# Patient Record
Sex: FEMALE | Race: WHITE | Employment: OTHER | URBAN - METROPOLITAN AREA
[De-identification: names, ages, dates, MRNs, and addresses within clinical notes are randomized per-mention and may not be internally consistent; named-entity substitution may affect disease eponyms.]

---

## 2021-05-24 ENCOUNTER — OFFICE VISIT (OUTPATIENT)
Dept: URGENT CARE | Age: 81
End: 2021-05-24
Payer: MEDICARE

## 2021-05-24 VITALS
DIASTOLIC BLOOD PRESSURE: 64 MMHG | TEMPERATURE: 97.8 F | OXYGEN SATURATION: 96 % | SYSTOLIC BLOOD PRESSURE: 123 MMHG | HEART RATE: 88 BPM | RESPIRATION RATE: 16 BRPM

## 2021-05-24 DIAGNOSIS — R30.9 URINARY PAIN: ICD-10-CM

## 2021-05-24 DIAGNOSIS — N30.01 ACUTE CYSTITIS WITH HEMATURIA: Primary | ICD-10-CM

## 2021-05-24 LAB
BILIRUB UR QL STRIP: NEGATIVE
GLUCOSE UR-MCNC: NEGATIVE MG/DL
KETONES P FAST UR STRIP-MCNC: NEGATIVE MG/DL
PH UR STRIP: 6 [PH] (ref 4.6–8)
PROT UR QL STRIP: NEGATIVE
SP GR UR STRIP: 1.03 (ref 1–1.03)
UA UROBILINOGEN AMB POC: NORMAL (ref 0.2–1)
URINALYSIS CLARITY POC: NORMAL
URINALYSIS COLOR POC: NORMAL
URINE BLOOD POC: NORMAL
URINE LEUKOCYTES POC: NEGATIVE
URINE NITRITES POC: NEGATIVE

## 2021-05-24 PROCEDURE — 99203 OFFICE O/P NEW LOW 30 MIN: CPT | Performed by: NURSE PRACTITIONER

## 2021-05-24 PROCEDURE — 81003 URINALYSIS AUTO W/O SCOPE: CPT | Performed by: NURSE PRACTITIONER

## 2021-05-24 RX ORDER — CETIRIZINE HYDROCHLORIDE 10 MG/1
CAPSULE, LIQUID FILLED ORAL
COMMUNITY

## 2021-05-24 RX ORDER — NITROFURANTOIN 25; 75 MG/1; MG/1
100 CAPSULE ORAL 2 TIMES DAILY
Qty: 14 CAPSULE | Refills: 0 | Status: SHIPPED | OUTPATIENT
Start: 2021-05-24 | End: 2021-05-31

## 2021-05-24 RX ORDER — HYDROCHLOROTHIAZIDE 12.5 MG/1
12.5 CAPSULE ORAL DAILY
COMMUNITY

## 2021-05-24 NOTE — PATIENT INSTRUCTIONS
Start use of macrobid twice daily and complete as directed. Start use of pyridium to help with urinary pain relief. Advised of side effects specifically change in urinary color. Encouraged to push fluids to at least 64 ounces daily. Strain urine to monitor for potential stone over the next several days due to presence of blood without bacteria. Will send urine for culture to further evaluate. Follow up with PCP if symptoms not improved over the next several days. Urinary Tract Infection (UTI) in Women: Care Instructions Overview A urinary tract infection, or UTI, is a general term for an infection anywhere between the kidneys and the urethra (where urine comes out). Most UTIs are bladder infections. They often cause pain or burning when you urinate. UTIs are caused by bacteria and can be cured with antibiotics. Be sure to complete your treatment so that the infection does not get worse. Follow-up care is a key part of your treatment and safety. Be sure to make and go to all appointments, and call your doctor if you are having problems. It's also a good idea to know your test results and keep a list of the medicines you take. How can you care for yourself at home? · Take your antibiotics as directed. Do not stop taking them just because you feel better. You need to take the full course of antibiotics. · Drink extra water and other fluids for the next day or two. This will help make the urine less concentrated and help wash out the bacteria that are causing the infection. (If you have kidney, heart, or liver disease and have to limit fluids, talk with your doctor before you increase the amount of fluids you drink.) · Avoid drinks that are carbonated or have caffeine. They can irritate the bladder. · Urinate often. Try to empty your bladder each time. · To relieve pain, take a hot bath or lay a heating pad set on low over your lower belly or genital area.  Never go to sleep with a heating pad in place. To prevent UTIs · Drink plenty of water each day. This helps you urinate often, which clears bacteria from your system. (If you have kidney, heart, or liver disease and have to limit fluids, talk with your doctor before you increase the amount of fluids you drink.) · Urinate when you need to. · If you are sexually active, urinate right after you have sex. · Change sanitary pads often. · Avoid douches, bubble baths, feminine hygiene sprays, and other feminine hygiene products that have deodorants. · After going to the bathroom, wipe from front to back. When should you call for help? Call your doctor now or seek immediate medical care if: 
  · Symptoms such as fever, chills, nausea, or vomiting get worse or appear for the first time.  
  · You have new pain in your back just below your rib cage. This is called flank pain.  
  · There is new blood or pus in your urine.  
  · You have any problems with your antibiotic medicine. Watch closely for changes in your health, and be sure to contact your doctor if: 
  · You are not getting better after taking an antibiotic for 2 days.  
  · Your symptoms go away but then come back. Where can you learn more? Go to http://www.gray.com/ Enter K668 in the search box to learn more about \"Urinary Tract Infection (UTI) in Women: Care Instructions. \" Current as of: June 29, 2020               Content Version: 12.8 © 2473-0308 [employer]. Care instructions adapted under license by [employer] (which disclaims liability or warranty for this information). If you have questions about a medical condition or this instruction, always ask your healthcare professional. [place] [address] any warranty or liability for your use of this information.

## 2021-05-24 NOTE — PROGRESS NOTES
The history is provided by the patient. Urinary Pain  This is a new problem. Episode onset: 2 days. The problem has been gradually worsening. Pertinent negatives include no chest pain, no abdominal pain, no headaches and no shortness of breath. Associated symptoms comments: Generalized \"discomfort in stomach\", painful urinary, frequency, urgency, blood in urine. Past Medical History:   Diagnosis Date    Diabetes ([address]     glucose runs 90-`140's at home    Eczema     Hypercholesteremia     Hypertension     h/o benign heart murmur    Spinal stenosis of lumbar region with radiculopathy     s/p laminectomy, still has some pain        Past Surgical History:   Procedure Laterality Date    HX APPENDECTOMY  1945    HX DILATION AND CURETTAGE      HX HYSTERECTOMY      HX LUMBAR LAMINECTOMY  2007    L3,4,5    HX ORTHOPAEDIC  2003    Lt shoulder impengment repair    HX TONSILLECTOMY           History reviewed. No pertinent family history. Social History     Socioeconomic History    Marital status:      Spouse name: Not on file    Number of children: Not on file    Years of education: Not on file    Highest education level: Not on file   Occupational History    Not on file   Tobacco Use    Smoking status: Former Smoker    Smokeless tobacco: Never Used   Substance and Sexual Activity    Alcohol use: Not on file     Comment: rarely    Drug use: No    Sexual activity: Not on file   Other Topics Concern    Not on file   Social History Narrative    Not on file     Social Determinants of Health     Financial Resource Strain:     Difficulty of Paying Living Expenses:    Food Insecurity:     Worried About [address] in the Last Year:     [address] in the Last Year:    Transportation Needs:     Lack of Transportation (Medical):      Lack of Transportation (Non-Medical):    Physical Activity:     Days of Exercise per Week:     Minutes of Exercise per Session:    Stress:     Feeling of Stress :    Social Connections:     Frequency of Communication with Friends and Family:     Frequency of Social Gatherings with Friends and Family:     Attends [employer] Services:     Active Member of Clubs or Organizations:     Attends Club or Organization Meetings:     Marital Status:    Intimate Partner Violence:     Fear of Current or Ex-Partner:     Emotionally Abused:     Physically Abused:     Sexually Abused: ALLERGIES: Augmentin [amoxicillin-pot clavulanate] and Codeine    Review of Systems   Constitutional: Negative for activity change, appetite change, fatigue and fever. Respiratory: Negative for chest tightness and shortness of breath. Cardiovascular: Negative for chest pain. Gastrointestinal: Negative for abdominal pain. Genitourinary: Positive for dysuria, frequency, hematuria and urgency. Negative for difficulty urinating, vaginal bleeding, vaginal discharge and vaginal pain. Skin: Negative for rash. Neurological: Negative for dizziness, light-headedness and headaches. Psychiatric/Behavioral: Negative for confusion. Vitals:    05/24/21 1335   BP: 123/64   Pulse: 88   Resp: 16   Temp: 97.8 °F (36.6 °C)   SpO2: 96%       Physical Exam  Vitals and nursing note reviewed. Constitutional:       General: She is not in acute distress. Appearance: Normal appearance. She is not ill-appearing. HENT:      Head: Normocephalic and atraumatic. Mouth/Throat:      Mouth: Mucous membranes are moist.   Eyes:      Conjunctiva/sclera: Conjunctivae normal.      Pupils: Pupils are equal, round, and reactive to light. Cardiovascular:      Rate and Rhythm: Normal rate and regular rhythm. Heart sounds: Normal heart sounds. No murmur heard. Pulmonary:      Effort: Pulmonary effort is normal.      Breath sounds: Normal breath sounds. No wheezing or rhonchi. Abdominal:      General: Abdomen is flat. Bowel sounds are normal. There is no distension. Palpations: Abdomen is soft. Tenderness: There is no abdominal tenderness. There is no right CVA tenderness, left CVA tenderness, guarding or rebound. Musculoskeletal:         General: Normal range of motion. Cervical back: Normal range of motion and neck supple. No muscular tenderness. Lymphadenopathy:      Cervical: No cervical adenopathy. Skin:     General: Skin is warm and dry. Findings: No rash. Neurological:      Mental Status: She is alert and oriented to person, place, and time. Psychiatric:         Mood and Affect: Mood normal.         Behavior: Behavior normal.         MDM    Procedures      ICD-10-CM ICD-9-CM   1. Acute cystitis with hematuria  N30.01 595.0   2. Urinary pain  R30.9 788.1       Orders Placed This Encounter    CULTURE, URINE     Standing Status:   Future     Standing Expiration Date:   11/24/2021    AMB POC URINALYSIS DIP STICK AUTO W/O MICRO    nitrofurantoin, macrocrystal-monohydrate, (Macrobid) 100 mg capsule     Sig: Take 1 Capsule by mouth two (2) times a day for 7 days. Dispense:  14 Capsule     Refill:  0      Results for orders placed or performed in visit on 05/24/21   AMB POC URINALYSIS DIP STICK AUTO W/O MICRO   Result Value Ref Range    Color (UA POC) Dark Yellow     Clarity (UA POC) Slightly Cloudy     Glucose (UA POC) Negative Negative    Bilirubin (UA POC) Negative Negative    Ketones (UA POC) Negative Negative    Specific gravity (UA POC) 1.030 1.001 - 1.035    Blood (UA POC) 3+ Negative    pH (UA POC) 6.0 4.6 - 8.0    Protein (UA POC) Negative Negative    Urobilinogen (UA POC) 0.2 mg/dL 0.2 - 1    Nitrites (UA POC) Negative Negative    Leukocyte esterase (UA POC) Negative Negative     Start use of macrobid twice daily and complete as directed. Start use of pyridium to help with urinary pain relief. Advised of side effects specifically change in urinary color. Encouraged to push fluids to at least 64 ounces daily.    Strain urine to monitor for potential stone over the next several days due to presence of blood without bacteria. Will send urine for culture to further evaluate. Follow up with PCP if symptoms not improved over the next several days. The patient is to follow up with PCP PRN. If signs and symptoms become worse the pt is to go to the ER.      Signed By: Aicha Garces NP     May 24, 2021

## 2023-05-12 RX ORDER — CETIRIZINE HYDROCHLORIDE 10 MG/1
CAPSULE, LIQUID FILLED ORAL
COMMUNITY

## 2023-05-12 RX ORDER — ROSUVASTATIN CALCIUM 40 MG/1
40 TABLET, COATED ORAL
COMMUNITY

## 2023-05-12 RX ORDER — LISINOPRIL 10 MG/1
TABLET ORAL DAILY
COMMUNITY

## 2023-05-12 RX ORDER — HYDROCHLOROTHIAZIDE 12.5 MG/1
12.5 CAPSULE, GELATIN COATED ORAL DAILY
COMMUNITY

## 2024-03-19 ENCOUNTER — OFFICE VISIT (OUTPATIENT)
Facility: CLINIC | Age: 84
End: 2024-03-19
Payer: MEDICARE

## 2024-03-19 VITALS
OXYGEN SATURATION: 95 % | RESPIRATION RATE: 16 BRPM | SYSTOLIC BLOOD PRESSURE: 119 MMHG | BODY MASS INDEX: 38.04 KG/M2 | TEMPERATURE: 97.5 F | HEIGHT: 67 IN | DIASTOLIC BLOOD PRESSURE: 78 MMHG | WEIGHT: 242.4 LBS | HEART RATE: 76 BPM

## 2024-03-19 DIAGNOSIS — J43.9 PULMONARY EMPHYSEMA, UNSPECIFIED EMPHYSEMA TYPE (HCC): ICD-10-CM

## 2024-03-19 DIAGNOSIS — I10 ESSENTIAL HYPERTENSION, BENIGN: ICD-10-CM

## 2024-03-19 DIAGNOSIS — E11.9 TYPE 2 DIABETES MELLITUS WITHOUT COMPLICATION, WITHOUT LONG-TERM CURRENT USE OF INSULIN (HCC): Primary | ICD-10-CM

## 2024-03-19 DIAGNOSIS — E55.9 VITAMIN D DEFICIENCY: ICD-10-CM

## 2024-03-19 DIAGNOSIS — T78.40XA ALLERGY, INITIAL ENCOUNTER: ICD-10-CM

## 2024-03-19 DIAGNOSIS — I44.7 LBBB (LEFT BUNDLE BRANCH BLOCK): ICD-10-CM

## 2024-03-19 DIAGNOSIS — I49.3 PVC'S (PREMATURE VENTRICULAR CONTRACTIONS): ICD-10-CM

## 2024-03-19 DIAGNOSIS — I50.32 CHRONIC HEART FAILURE WITH PRESERVED EJECTION FRACTION (HCC): ICD-10-CM

## 2024-03-19 DIAGNOSIS — E78.2 MIXED HYPERLIPIDEMIA: ICD-10-CM

## 2024-03-19 DIAGNOSIS — Z78.0 POST-MENOPAUSAL: ICD-10-CM

## 2024-03-19 PROBLEM — I50.30 HEART FAILURE WITH PRESERVED EJECTION FRACTION (HCC): Status: ACTIVE | Noted: 2024-03-19

## 2024-03-19 PROBLEM — E78.5 HYPERLIPIDEMIA: Status: ACTIVE | Noted: 2024-03-19

## 2024-03-19 PROCEDURE — 1090F PRES/ABSN URINE INCON ASSESS: CPT | Performed by: NURSE PRACTITIONER

## 2024-03-19 PROCEDURE — G8484 FLU IMMUNIZE NO ADMIN: HCPCS | Performed by: NURSE PRACTITIONER

## 2024-03-19 PROCEDURE — 1123F ACP DISCUSS/DSCN MKR DOCD: CPT | Performed by: NURSE PRACTITIONER

## 2024-03-19 PROCEDURE — 3074F SYST BP LT 130 MM HG: CPT | Performed by: NURSE PRACTITIONER

## 2024-03-19 PROCEDURE — 1036F TOBACCO NON-USER: CPT | Performed by: NURSE PRACTITIONER

## 2024-03-19 PROCEDURE — 3023F SPIROM DOC REV: CPT | Performed by: NURSE PRACTITIONER

## 2024-03-19 PROCEDURE — G8417 CALC BMI ABV UP PARAM F/U: HCPCS | Performed by: NURSE PRACTITIONER

## 2024-03-19 PROCEDURE — G8427 DOCREV CUR MEDS BY ELIG CLIN: HCPCS | Performed by: NURSE PRACTITIONER

## 2024-03-19 PROCEDURE — 99214 OFFICE O/P EST MOD 30 MIN: CPT | Performed by: NURSE PRACTITIONER

## 2024-03-19 PROCEDURE — G8400 PT W/DXA NO RESULTS DOC: HCPCS | Performed by: NURSE PRACTITIONER

## 2024-03-19 PROCEDURE — 3078F DIAST BP <80 MM HG: CPT | Performed by: NURSE PRACTITIONER

## 2024-03-19 RX ORDER — SPIRONOLACTONE 25 MG/1
25 TABLET ORAL DAILY
COMMUNITY
Start: 2023-09-08 | End: 2024-03-19 | Stop reason: SDUPTHER

## 2024-03-19 RX ORDER — ROSUVASTATIN CALCIUM 40 MG/1
40 TABLET, COATED ORAL DAILY
Qty: 90 TABLET | Refills: 3 | Status: SHIPPED | OUTPATIENT
Start: 2024-03-19

## 2024-03-19 RX ORDER — METFORMIN HYDROCHLORIDE 500 MG/1
500 TABLET, EXTENDED RELEASE ORAL 2 TIMES DAILY WITH MEALS
Qty: 180 TABLET | Refills: 3 | Status: SHIPPED | OUTPATIENT
Start: 2024-03-19

## 2024-03-19 RX ORDER — FUROSEMIDE 20 MG/1
20 TABLET ORAL EVERY OTHER DAY
Qty: 45 TABLET | Refills: 3 | Status: SHIPPED | OUTPATIENT
Start: 2024-03-19 | End: 2025-03-19

## 2024-03-19 RX ORDER — ESTRADIOL 0.1 MG/G
1 CREAM VAGINAL
COMMUNITY
Start: 2023-10-11 | End: 2024-03-19 | Stop reason: SDUPTHER

## 2024-03-19 RX ORDER — MONTELUKAST SODIUM 10 MG/1
10 TABLET ORAL NIGHTLY
Qty: 90 TABLET | Refills: 3 | Status: SHIPPED | OUTPATIENT
Start: 2024-03-19

## 2024-03-19 RX ORDER — TRIAMCINOLONE ACETONIDE 1 MG/G
CREAM TOPICAL 2 TIMES DAILY
COMMUNITY
Start: 2023-10-11 | End: 2024-10-10

## 2024-03-19 RX ORDER — ASPIRIN 81 MG/1
TABLET, CHEWABLE ORAL
COMMUNITY

## 2024-03-19 RX ORDER — FUROSEMIDE 20 MG/1
20 TABLET ORAL EVERY OTHER DAY
COMMUNITY
Start: 2023-09-08 | End: 2024-03-19 | Stop reason: SDUPTHER

## 2024-03-19 RX ORDER — SPIRONOLACTONE 25 MG/1
25 TABLET ORAL DAILY
Qty: 30 TABLET | Refills: 11 | Status: SHIPPED | OUTPATIENT
Start: 2024-03-19 | End: 2025-03-19

## 2024-03-19 RX ORDER — ESTRADIOL 0.1 MG/G
1 CREAM VAGINAL
Qty: 42.5 G | Refills: 2 | Status: SHIPPED | OUTPATIENT
Start: 2024-03-20 | End: 2025-03-20

## 2024-03-19 RX ORDER — LISINOPRIL 10 MG/1
10 TABLET ORAL DAILY
Qty: 90 TABLET | Refills: 3 | Status: SHIPPED | OUTPATIENT
Start: 2024-03-19

## 2024-03-19 RX ORDER — METFORMIN HYDROCHLORIDE 500 MG/1
TABLET, EXTENDED RELEASE ORAL
COMMUNITY
Start: 2024-02-16 | End: 2024-03-19

## 2024-03-19 ASSESSMENT — PATIENT HEALTH QUESTIONNAIRE - PHQ9
SUM OF ALL RESPONSES TO PHQ QUESTIONS 1-9: 0
SUM OF ALL RESPONSES TO PHQ9 QUESTIONS 1 & 2: 0
1. LITTLE INTEREST OR PLEASURE IN DOING THINGS: NOT AT ALL
2. FEELING DOWN, DEPRESSED OR HOPELESS: NOT AT ALL

## 2024-03-19 NOTE — PROGRESS NOTES
Maura Batista is a 83 y.o. female    Chief Complaint   Patient presents with    New Patient     Establish care       /78   Pulse 76   Temp 97.5 °F (36.4 °C)   Resp 16   Ht 1.702 m (5' 7\")   Wt 110 kg (242 lb 6.4 oz)   SpO2 95%   BMI 37.97 kg/m²         1. Have you been to the ER, urgent care clinic since your last visit?  Hospitalized since your last visit? No    2. Have you seen or consulted any other health care providers outside of the [facility] since your last visit?  Include any pap smears or colon screening. No    Learning Assessment:       No data to display                Fall Risk Assessment:      3/19/2024     3:00 PM   Amb Fall Risk Assessment and TUG Test   Do you feel unsteady or are you worried about falling?  no   2 or more falls in past year? no   Fall with injury in past year? no       Abuse Screening:       No data to display                ADL Screening:       No data to display                
140's-160s since starting metoprolol  Repeat labs today  Orders:  -     Hemoglobin A1C; Future  -     Comprehensive Metabolic Panel; Future  -     CBC with Auto Differential; Future  -     metFORMIN (GLUCOPHAGE-XR) 500 MG extended release tablet; Take 1 tablet by mouth with breakfast and with evening meal, Disp-180 tablet, R-3Normal  2. Essential hypertension, benign  -     TSH; Future  -     Comprehensive Metabolic Panel; Future  -     CBC with Auto Differential; Future  -     lisinopril (PRINIVIL;ZESTRIL) 10 MG tablet; Take 1 tablet by mouth daily, Disp-90 tablet, R-3Normal  3. Mixed hyperlipidemia  -     Lipid Panel; Future  -     TSH; Future  -     Comprehensive Metabolic Panel; Future  -     rosuvastatin (CRESTOR) 40 MG tablet; Take 1 tablet by mouth daily, Disp-90 tablet, R-3Normal  4. Chronic heart failure with preserved ejection fraction (HCC)  -     furosemide (LASIX) 20 MG tablet; Take 1 tablet by mouth every other day, Disp-45 tablet, R-3Normal  -     spironolactone (ALDACTONE) 25 MG tablet; Take 1 tablet by mouth daily, Disp-30 tablet, R-11Normal  5. PVC's (premature ventricular contractions)  -     TSH; Future  -     Comprehensive Metabolic Panel; Future  6. LBBB (left bundle branch block)  7. Vitamin D deficiency  -     Vitamin D 25 Hydroxy; Future  8. Post-menopausal  -     DEXA BONE DENSITY AXIAL SKELETON; Future  -     estradiol (ESTRACE) 0.1 MG/GM vaginal cream; Place 1 g vaginally three times a week, Disp-42.5 g, R-2Normal  9. Allergy, initial encounter  -     montelukast (SINGULAIR) 10 MG tablet; Take 1 tablet by mouth nightly, Disp-90 tablet, R-3Normal  10. Pulmonary emphysema, unspecified emphysema type (HCC)         Reviewed previous labs, outside records, medical history, family history, surgical history, social history, medications and allergies.    Risk/Benefit, proper use, and side effects of medications reviewed.      Return in about 3 months (around 6/19/2024) for Annual Wellness Visit

## 2024-03-20 LAB
25(OH)D3 SERPL-MCNC: 36.4 NG/ML (ref 30–100)
ALBUMIN SERPL-MCNC: 4 G/DL (ref 3.5–5)
ALBUMIN/GLOB SERPL: 1.3 (ref 1.1–2.2)
ALP SERPL-CCNC: 45 U/L (ref 45–117)
ALT SERPL-CCNC: 22 U/L (ref 12–78)
ANION GAP SERPL CALC-SCNC: 7 MMOL/L (ref 5–15)
AST SERPL-CCNC: 13 U/L (ref 15–37)
BASOPHILS # BLD: 0.1 K/UL (ref 0–0.1)
BASOPHILS NFR BLD: 1 % (ref 0–1)
BILIRUB SERPL-MCNC: 0.3 MG/DL (ref 0.2–1)
BUN SERPL-MCNC: 35 MG/DL (ref 6–20)
BUN/CREAT SERPL: 34 (ref 12–20)
CALCIUM SERPL-MCNC: 10 MG/DL (ref 8.5–10.1)
CHLORIDE SERPL-SCNC: 104 MMOL/L (ref 97–108)
CHOLEST SERPL-MCNC: 122 MG/DL
CO2 SERPL-SCNC: 25 MMOL/L (ref 21–32)
CREAT SERPL-MCNC: 1.04 MG/DL (ref 0.55–1.02)
DIFFERENTIAL METHOD BLD: NORMAL
EOSINOPHIL # BLD: 0.3 K/UL (ref 0–0.4)
EOSINOPHIL NFR BLD: 3 % (ref 0–7)
ERYTHROCYTE [DISTWIDTH] IN BLOOD BY AUTOMATED COUNT: 13.1 % (ref 11.5–14.5)
EST. AVERAGE GLUCOSE BLD GHB EST-MCNC: 131 MG/DL
GLOBULIN SER CALC-MCNC: 3.2 G/DL (ref 2–4)
GLUCOSE SERPL-MCNC: 141 MG/DL (ref 65–100)
HBA1C MFR BLD: 6.2 % (ref 4–5.6)
HCT VFR BLD AUTO: 40 % (ref 35–47)
HDLC SERPL-MCNC: 57 MG/DL
HDLC SERPL: 2.1 (ref 0–5)
HGB BLD-MCNC: 13.5 G/DL (ref 11.5–16)
IMM GRANULOCYTES # BLD AUTO: 0 K/UL (ref 0–0.04)
IMM GRANULOCYTES NFR BLD AUTO: 0 % (ref 0–0.5)
LDLC SERPL CALC-MCNC: 32.8 MG/DL (ref 0–100)
LYMPHOCYTES # BLD: 1.4 K/UL (ref 0.8–3.5)
LYMPHOCYTES NFR BLD: 17 % (ref 12–49)
MCH RBC QN AUTO: 28.8 PG (ref 26–34)
MCHC RBC AUTO-ENTMCNC: 33.8 G/DL (ref 30–36.5)
MCV RBC AUTO: 85.3 FL (ref 80–99)
MONOCYTES # BLD: 0.6 K/UL (ref 0–1)
MONOCYTES NFR BLD: 7 % (ref 5–13)
NEUTS SEG # BLD: 5.9 K/UL (ref 1.8–8)
NEUTS SEG NFR BLD: 72 % (ref 32–75)
NRBC # BLD: 0 K/UL (ref 0–0.01)
NRBC BLD-RTO: 0 PER 100 WBC
PLATELET # BLD AUTO: 316 K/UL (ref 150–400)
PMV BLD AUTO: 9.8 FL (ref 8.9–12.9)
POTASSIUM SERPL-SCNC: 4.1 MMOL/L (ref 3.5–5.1)
PROT SERPL-MCNC: 7.2 G/DL (ref 6.4–8.2)
RBC # BLD AUTO: 4.69 M/UL (ref 3.8–5.2)
SODIUM SERPL-SCNC: 136 MMOL/L (ref 136–145)
TRIGL SERPL-MCNC: 161 MG/DL
TSH SERPL DL<=0.05 MIU/L-ACNC: 1.04 UIU/ML (ref 0.36–3.74)
VLDLC SERPL CALC-MCNC: 32.2 MG/DL
WBC # BLD AUTO: 8.2 K/UL (ref 3.6–11)

## 2024-04-16 ENCOUNTER — ANESTHESIA EVENT (OUTPATIENT)
Facility: HOSPITAL | Age: 84
End: 2024-04-16
Payer: MEDICARE

## 2024-04-16 ENCOUNTER — HOSPITAL ENCOUNTER (OUTPATIENT)
Facility: HOSPITAL | Age: 84
Setting detail: OUTPATIENT SURGERY
Discharge: HOME | End: 2024-04-16
Attending: COLON & RECTAL SURGERY | Admitting: COLON & RECTAL SURGERY
Payer: MEDICARE

## 2024-04-16 ENCOUNTER — ANESTHESIA (OUTPATIENT)
Facility: HOSPITAL | Age: 84
End: 2024-04-16
Payer: MEDICARE

## 2024-04-16 VITALS
WEIGHT: 240 LBS | HEART RATE: 72 BPM | RESPIRATION RATE: 19 BRPM | DIASTOLIC BLOOD PRESSURE: 98 MMHG | TEMPERATURE: 97.6 F | OXYGEN SATURATION: 97 % | SYSTOLIC BLOOD PRESSURE: 128 MMHG | BODY MASS INDEX: 37.67 KG/M2 | HEIGHT: 67 IN

## 2024-04-16 PROCEDURE — 3600007502: Performed by: COLON & RECTAL SURGERY

## 2024-04-16 PROCEDURE — 7100000011 HC PHASE II RECOVERY - ADDTL 15 MIN: Performed by: COLON & RECTAL SURGERY

## 2024-04-16 PROCEDURE — 2500000003 HC RX 250 WO HCPCS: Performed by: ANESTHESIOLOGY

## 2024-04-16 PROCEDURE — 3600007512: Performed by: COLON & RECTAL SURGERY

## 2024-04-16 PROCEDURE — 7100000010 HC PHASE II RECOVERY - FIRST 15 MIN: Performed by: COLON & RECTAL SURGERY

## 2024-04-16 PROCEDURE — 2709999900 HC NON-CHARGEABLE SUPPLY: Performed by: COLON & RECTAL SURGERY

## 2024-04-16 PROCEDURE — 2580000003 HC RX 258: Performed by: COLON & RECTAL SURGERY

## 2024-04-16 PROCEDURE — 3700000001 HC ADD 15 MINUTES (ANESTHESIA): Performed by: COLON & RECTAL SURGERY

## 2024-04-16 PROCEDURE — 3700000000 HC ANESTHESIA ATTENDED CARE: Performed by: COLON & RECTAL SURGERY

## 2024-04-16 PROCEDURE — 6360000002 HC RX W HCPCS: Performed by: ANESTHESIOLOGY

## 2024-04-16 RX ORDER — SODIUM CHLORIDE 0.9 % (FLUSH) 0.9 %
5-40 SYRINGE (ML) INJECTION PRN
Status: DISCONTINUED | OUTPATIENT
Start: 2024-04-16 | End: 2024-04-16 | Stop reason: HOSPADM

## 2024-04-16 RX ORDER — SODIUM CHLORIDE 9 MG/ML
25 INJECTION, SOLUTION INTRAVENOUS PRN
Status: DISCONTINUED | OUTPATIENT
Start: 2024-04-16 | End: 2024-04-16 | Stop reason: HOSPADM

## 2024-04-16 RX ORDER — LIDOCAINE HYDROCHLORIDE 20 MG/ML
INJECTION, SOLUTION EPIDURAL; INFILTRATION; INTRACAUDAL; PERINEURAL PRN
Status: DISCONTINUED | OUTPATIENT
Start: 2024-04-16 | End: 2024-04-16 | Stop reason: SDUPTHER

## 2024-04-16 RX ORDER — SODIUM CHLORIDE 0.9 % (FLUSH) 0.9 %
5-40 SYRINGE (ML) INJECTION EVERY 12 HOURS SCHEDULED
Status: DISCONTINUED | OUTPATIENT
Start: 2024-04-16 | End: 2024-04-16 | Stop reason: HOSPADM

## 2024-04-16 RX ADMIN — PROPOFOL 300 MG: 10 INJECTION, EMULSION INTRAVENOUS at 09:37

## 2024-04-16 RX ADMIN — SODIUM CHLORIDE 25 ML: 9 INJECTION, SOLUTION INTRAVENOUS at 09:05

## 2024-04-16 RX ADMIN — LIDOCAINE HYDROCHLORIDE 40 MG: 20 INJECTION, SOLUTION EPIDURAL; INFILTRATION; INTRACAUDAL; PERINEURAL at 09:11

## 2024-04-16 ASSESSMENT — PAIN SCALES - GENERAL: PAINLEVEL_OUTOF10: 0

## 2024-04-16 ASSESSMENT — PAIN - FUNCTIONAL ASSESSMENT: PAIN_FUNCTIONAL_ASSESSMENT: NONE - DENIES PAIN

## 2024-04-16 NOTE — DISCHARGE INSTRUCTIONS
Maura Griffind  [id number]  1940    COLON DISCHARGE INSTRUCTIONS  Discomfort:  Redness at IV site- apply warm compress to area; if redness or soreness persist- contact your physician  There may be a slight amount of blood passed from the rectum  Gaseous discomfort- walking, belching will help relieve any discomfort  You may not operate a vehicle for 12 hours  You may not engage in an occupation involving machinery or appliances for rest of today  You may not drink alcoholic beverages for at least 12 hours  Avoid making any critical decisions for at least 24 hour  DIET:   High fiber diet.   - however -  remember your colon is empty and a heavy meal will produce gas.   Avoid these foods:  vegetables, fried / greasy foods, carbonated drinks for today    MEDICATIONS:  [unfilled]     ACTIVITY:  You may resume your normal daily activities it is recommended that you spend the remainder of the day resting -  avoid any strenuous activity.    CALL M.D.  ANY SIGN OF:   Increasing pain, nausea, vomiting  Abdominal distension (swelling)  New increased bleeding (oral or rectal)  Fever (chills)  Pain in chest area  Bloody discharge from nose or mouth  Shortness of breath     Follow-up Instructions:   Call Billy Mera MD if any questions or problems.   Telephone # [phone]  Biopsy results will be available in  7 to10 days  Should have a repeat colonoscopy in 5 years.    COLONOSCOPY FINDINGS:  Your colonoscopy showed: Normal colon.      Patient Education on Sedation / Analgesia Administered for Procedure      For 24 hours after general anesthesia or intravenous analgesia / sedation:  Have someone responsible help you with your care  Limit your activities  Do not drive and operate hazardous machinery  Do not make important personal, legal or business decisions  Do not drink alcoholic beverages  If you have not urinated within 8 hours after discharge, please contact your physician  Resume your medications unless

## 2024-04-16 NOTE — ANESTHESIA POSTPROCEDURE EVALUATION
Department of Anesthesiology  Postprocedure Note    Patient: Maura Batista  MRN: [id number]  YOB: 1940  Date of evaluation: 4/16/2024    Procedure Summary       Date: 04/16/24 Room / Location: [facility] ENDO 03 / [facility] ENDOSCOPY    Anesthesia Start: 0904 Anesthesia Stop: 0940    Procedure: COLONOSCOPY Diagnosis:       Hx of colonic polyps      FH: colon cancer      (Hx of colonic polyps [Z86.010])      (FH: colon cancer [Z80.0])    Surgeons: Billy Mera MD Responsible Provider: Yue Soria DO    Anesthesia Type: TIVA ASA Status: 3            Anesthesia Type: TIVA    Zaki Phase I: Zaki Score: 10    Zaki Phase II: Zaki Score: 10    Anesthesia Post Evaluation    Patient location during evaluation: PACU  Patient participation: complete - patient participated  Level of consciousness: awake and alert  Airway patency: patent  Nausea & Vomiting: no nausea  Cardiovascular status: hemodynamically stable  Respiratory status: acceptable  Hydration status: euvolemic    No notable events documented.

## 2024-04-16 NOTE — H&P
abnormality, atraumatic.   Eyes:  Conjunctivae/corneas clear. PERRL, EOMs intact.    Ears:  Normal external ear canals both ears.   Nose: Nares normal. Septum midline.No drainage.   Throat: Lips, mucosa, and tongue normal. Teeth and gums normal.   Neck: Supple, symmetrical, trachea midline, no adenopathy   Back:   Symmetric, no curvature. ROM normal.   Lungs:   Clear   Chest wall:  No tenderness or deformity.   Heart:  Regular rate and rhythm       Abdomen:   Soft, non-tender. Bowel sounds normal. No masses,  No organomegaly.   Genitalia:  deferred       Extremities: Extremities normal, atraumatic, no cyanosis or edema.       Skin: Skin color, texture, turgor normal. No rashes or lesions.       Neurologic: CNII-XII intact. Normal strength, sensation and reflexes throughout.         Imaging:  images and reports reviewed    Lab Review:  No results found for this or any previous visit (from the past 24 hour(s)).    Labs and radiology: images and reports reviewed      Assessment:   Hx susan    Plan:     1. I recommend proceeding with colonoscopy. Treatment alternatives were discussed.  2. Discussed aspects of surgical intervention, methods, risks (including by not limited to infection, bleeding, hematoma, and perforation of the intestines or solid organs), and the risks of general anesthetic. The patient understands the risks; any and all questions were answered to the patient's satisfaction.    Signed By: Billy Mera MD     April 16, 2024

## 2024-04-16 NOTE — PERIOP NOTE
ARRIVAL INFORMATION:  Verified patient name and date of birth, scheduled procedure, and informed consent.     : Gayatri (daughter)contact number: [phone]  Physician and staff can share information with the .     Belongings with patient include:  Clothing,Glasses, walker    GI FOCUSED ASSESSMENT:  Neuro: Awake, alert, oriented x4  Respiratory: even and unlabored   GI: soft and non-distended  EKG Rhythm: LBBB    Education:Reviewed general discharge instructions and  information.

## 2024-04-16 NOTE — ANESTHESIA PRE PROCEDURE
Reconciliation, Ar       Current medications:    No current facility-administered medications for this encounter.       Allergies:    Allergies   Allergen Reactions   • Amoxicillin-Pot Clavulanate Nausea And Vomiting   • Codeine Nausea And Vomiting       Problem List:    Patient Active Problem List   Diagnosis Code   • Diabetes ([facility]) E11.9   • Essential hypertension, benign I10   • Hyperlipidemia E78.5   • Heart failure with preserved ejection fraction ([facility]) I50.30   • PVC's (premature ventricular contractions) I49.3   • LBBB (left bundle branch block) I44.7       Past Medical History:        Diagnosis Date   • Diabetes ([facility])     glucose runs 90-`140's at home   • Eczema    • Emphysema lung ([facility])    • Essential hypertension, benign    • Heart failure with preserved ejection fraction ([facility])    • Hyperlipidemia    • IBS (irritable bowel syndrome)    • LBBB (left bundle branch block)    • Prediabetes    • PVC's (premature ventricular contractions)    • Skin cancer    • Spinal stenosis of lumbar region with radiculopathy     s/p laminectomy, still has some pain   • Thyroid nodule    • Urinary incontinence        Past Surgical History:        Procedure Laterality Date   • APPENDECTOMY  1945   • CARDIAC CATHETERIZATION  08/2022   • COLONOSCOPY     • DILATION AND CURETTAGE OF UTERUS     • LUMBAR LAMINECTOMY  2007    L3,4,5   • ORTHOPEDIC SURGERY  2003    Lt shoulder impengment repair   • TONSILLECTOMY     • TOTAL VAGINAL HYSTERECTOMY  07/2020    (last pap 7/28/2020 NILM/HPV neg - [facility])       Social History:    Social History     Tobacco Use   • Smoking status: Former     Current packs/day: 0.50     Average packs/day: 0.5 packs/day for 63.3 years (31.6 ttl pk-yrs)     Types: Cigarettes     Start date: 1961   • Smokeless tobacco: Never   Substance Use Topics   • Alcohol use: Yes                                Counseling given: Not Answered      Vital Signs (Current): There were no vitals filed for this visit.

## 2024-04-16 NOTE — PROGRESS NOTES
Endoscope was pre-cleaned at bedside immediately following procedure by Newton Avery .    Glasses returned to patient

## 2024-04-16 NOTE — OP NOTE
[phone]    Colonoscopy Procedure Note      Indications: Previous adenomatous polyp    : Billy Mera MD    Staff: Circulator: Jenni Shcneider RN  Surgical Assistant: Newton Avery    Referring Provider: Kate Meza APRN - CNP     Anesthesia/Sedation: MAC provided by Anesthesia    Pre-Procedure Exam:  Airway: clear   Heart: normal S1and S2    Lungs: clear bilateral  Abdomen: soft, nontender, bowel sounds present and normal in all quadrants   Mental Status: awake, alert, and oriented to person, place, and time      Procedure in Detail:  Informed consent was obtained for the procedure, including sedation.  Risks of perforation, hemorrhage, adverse drug reaction, and aspiration were discussed. The patient was placed in the left lateral decubitus position.  Based on the pre-procedure assessment, including review of the patient's medical history, medications, allergies, and review of systems, he had been deemed to be an appropriate candidate for moderate sedation; he was therefore sedated with the medications listed above.   The patient was monitored continuously with ECG tracing, pulse oximetry, blood pressure monitoring, and direct observations.      A rectal examination was performed. The [id number] was inserted into the rectum and advanced under direct vision to the cecum, which was identified by the ileocecal valve and appendiceal orifice.  The quality of the colonic preparation was excellent.  A careful inspection was made as the colonoscope was withdrawn, including a retroflexed view of the rectum; findings and interventions are described below.  Appropriate photodocumentation was obtained.    Findings:   ANUS: Anal exam reveals no masses or hemorrhoids, sphincter tone is normal.   RECTUM: Rectal exam reveals no masses or hemorrhoids.   SIGMOID COLON: The mucosa is normal with good vascular pattern and without ulcers, diverticula, and polyps.   DESCENDING COLON: The mucosa is normal with

## 2024-06-23 SDOH — ECONOMIC STABILITY: FOOD INSECURITY: WITHIN THE PAST 12 MONTHS, YOU WORRIED THAT YOUR FOOD WOULD RUN OUT BEFORE YOU GOT MONEY TO BUY MORE.: NEVER TRUE

## 2024-06-23 SDOH — ECONOMIC STABILITY: TRANSPORTATION INSECURITY
IN THE PAST 12 MONTHS, HAS LACK OF TRANSPORTATION KEPT YOU FROM MEETINGS, WORK, OR FROM GETTING THINGS NEEDED FOR DAILY LIVING?: NO

## 2024-06-23 SDOH — ECONOMIC STABILITY: HOUSING INSECURITY
IN THE LAST 12 MONTHS, WAS THERE A TIME WHEN YOU DID NOT HAVE A STEADY PLACE TO SLEEP OR SLEPT IN A SHELTER (INCLUDING NOW)?: NO

## 2024-06-23 SDOH — ECONOMIC STABILITY: FOOD INSECURITY: WITHIN THE PAST 12 MONTHS, THE FOOD YOU BOUGHT JUST DIDN'T LAST AND YOU DIDN'T HAVE MONEY TO GET MORE.: NEVER TRUE

## 2024-06-23 SDOH — HEALTH STABILITY: PHYSICAL HEALTH: ON AVERAGE, HOW MANY DAYS PER WEEK DO YOU ENGAGE IN MODERATE TO STRENUOUS EXERCISE (LIKE A BRISK WALK)?: 0 DAYS

## 2024-06-23 SDOH — ECONOMIC STABILITY: INCOME INSECURITY: HOW HARD IS IT FOR YOU TO PAY FOR THE VERY BASICS LIKE FOOD, HOUSING, MEDICAL CARE, AND HEATING?: NOT VERY HARD

## 2024-06-23 SDOH — HEALTH STABILITY: PHYSICAL HEALTH: ON AVERAGE, HOW MANY MINUTES DO YOU ENGAGE IN EXERCISE AT THIS LEVEL?: 0 MIN

## 2024-06-23 ASSESSMENT — PATIENT HEALTH QUESTIONNAIRE - PHQ9
SUM OF ALL RESPONSES TO PHQ QUESTIONS 1-9: 0
2. FEELING DOWN, DEPRESSED OR HOPELESS: NOT AT ALL
SUM OF ALL RESPONSES TO PHQ QUESTIONS 1-9: 0
SUM OF ALL RESPONSES TO PHQ9 QUESTIONS 1 & 2: 0
1. LITTLE INTEREST OR PLEASURE IN DOING THINGS: NOT AT ALL
SUM OF ALL RESPONSES TO PHQ QUESTIONS 1-9: 0
SUM OF ALL RESPONSES TO PHQ QUESTIONS 1-9: 0

## 2024-06-23 ASSESSMENT — LIFESTYLE VARIABLES
HOW OFTEN DO YOU HAVE A DRINK CONTAINING ALCOHOL: MONTHLY OR LESS
HOW OFTEN DO YOU HAVE A DRINK CONTAINING ALCOHOL: 2
HOW MANY STANDARD DRINKS CONTAINING ALCOHOL DO YOU HAVE ON A TYPICAL DAY: 1
HOW OFTEN DO YOU HAVE SIX OR MORE DRINKS ON ONE OCCASION: 1
HOW MANY STANDARD DRINKS CONTAINING ALCOHOL DO YOU HAVE ON A TYPICAL DAY: 1 OR 2

## 2024-06-26 ENCOUNTER — OFFICE VISIT (OUTPATIENT)
Facility: CLINIC | Age: 84
End: 2024-06-26
Payer: MEDICARE

## 2024-06-26 VITALS
TEMPERATURE: 97.5 F | HEIGHT: 67 IN | RESPIRATION RATE: 12 BRPM | SYSTOLIC BLOOD PRESSURE: 135 MMHG | OXYGEN SATURATION: 95 % | HEART RATE: 73 BPM | WEIGHT: 240 LBS | DIASTOLIC BLOOD PRESSURE: 81 MMHG | BODY MASS INDEX: 37.67 KG/M2

## 2024-06-26 DIAGNOSIS — E11.9 TYPE 2 DIABETES MELLITUS WITHOUT COMPLICATION, WITHOUT LONG-TERM CURRENT USE OF INSULIN (HCC): ICD-10-CM

## 2024-06-26 DIAGNOSIS — Z00.00 MEDICARE ANNUAL WELLNESS VISIT, SUBSEQUENT: Primary | ICD-10-CM

## 2024-06-26 DIAGNOSIS — I10 ESSENTIAL HYPERTENSION, BENIGN: ICD-10-CM

## 2024-06-26 DIAGNOSIS — I50.32 CHRONIC HEART FAILURE WITH PRESERVED EJECTION FRACTION (HCC): ICD-10-CM

## 2024-06-26 DIAGNOSIS — M48.00 SPINAL STENOSIS, UNSPECIFIED SPINAL REGION: ICD-10-CM

## 2024-06-26 DIAGNOSIS — Z78.0 POST-MENOPAUSAL: ICD-10-CM

## 2024-06-26 DIAGNOSIS — F40.298 FEAR OF FALLING: ICD-10-CM

## 2024-06-26 DIAGNOSIS — E78.2 MIXED HYPERLIPIDEMIA: ICD-10-CM

## 2024-06-26 DIAGNOSIS — E66.01 SEVERE OBESITY (BMI 35.0-39.9) WITH COMORBIDITY (HCC): ICD-10-CM

## 2024-06-26 DIAGNOSIS — R29.898 LEFT LEG WEAKNESS: ICD-10-CM

## 2024-06-26 DIAGNOSIS — J43.8 OTHER EMPHYSEMA (HCC): ICD-10-CM

## 2024-06-26 PROBLEM — J30.1 SEASONAL ALLERGIC RHINITIS DUE TO POLLEN: Status: ACTIVE | Noted: 2024-06-26

## 2024-06-26 PROBLEM — K11.8 PAROTID NODULE: Status: ACTIVE | Noted: 2024-06-26

## 2024-06-26 PROBLEM — Z29.9 PREVENTIVE MEASURE: Status: ACTIVE | Noted: 2024-06-26

## 2024-06-26 PROBLEM — K58.8 OTHER IRRITABLE BOWEL SYNDROME: Status: ACTIVE | Noted: 2024-06-26

## 2024-06-26 PROCEDURE — 1123F ACP DISCUSS/DSCN MKR DOCD: CPT | Performed by: NURSE PRACTITIONER

## 2024-06-26 PROCEDURE — 3079F DIAST BP 80-89 MM HG: CPT | Performed by: NURSE PRACTITIONER

## 2024-06-26 PROCEDURE — G8417 CALC BMI ABV UP PARAM F/U: HCPCS | Performed by: NURSE PRACTITIONER

## 2024-06-26 PROCEDURE — 1090F PRES/ABSN URINE INCON ASSESS: CPT | Performed by: NURSE PRACTITIONER

## 2024-06-26 PROCEDURE — 3023F SPIROM DOC REV: CPT | Performed by: NURSE PRACTITIONER

## 2024-06-26 PROCEDURE — G8400 PT W/DXA NO RESULTS DOC: HCPCS | Performed by: NURSE PRACTITIONER

## 2024-06-26 PROCEDURE — 3044F HG A1C LEVEL LT 7.0%: CPT | Performed by: NURSE PRACTITIONER

## 2024-06-26 PROCEDURE — 3075F SYST BP GE 130 - 139MM HG: CPT | Performed by: NURSE PRACTITIONER

## 2024-06-26 PROCEDURE — G8427 DOCREV CUR MEDS BY ELIG CLIN: HCPCS | Performed by: NURSE PRACTITIONER

## 2024-06-26 PROCEDURE — 99214 OFFICE O/P EST MOD 30 MIN: CPT | Performed by: NURSE PRACTITIONER

## 2024-06-26 PROCEDURE — G0439 PPPS, SUBSEQ VISIT: HCPCS | Performed by: NURSE PRACTITIONER

## 2024-06-26 PROCEDURE — 1036F TOBACCO NON-USER: CPT | Performed by: NURSE PRACTITIONER

## 2024-06-26 RX ORDER — ESTRADIOL 0.1 MG/G
1 CREAM VAGINAL
Qty: 42.5 G | Refills: 2 | Status: SHIPPED | OUTPATIENT
Start: 2024-06-26 | End: 2025-06-26

## 2024-06-26 RX ORDER — SPIRONOLACTONE 25 MG/1
25 TABLET ORAL DAILY
Qty: 90 TABLET | Refills: 3 | Status: SHIPPED | OUTPATIENT
Start: 2024-06-26 | End: 2025-06-26

## 2024-06-26 RX ORDER — ROSUVASTATIN CALCIUM 40 MG/1
40 TABLET, COATED ORAL DAILY
Qty: 90 TABLET | Refills: 3 | Status: SHIPPED | OUTPATIENT
Start: 2024-06-26

## 2024-06-26 RX ORDER — METFORMIN HYDROCHLORIDE 500 MG/1
500 TABLET, EXTENDED RELEASE ORAL 2 TIMES DAILY WITH MEALS
Qty: 180 TABLET | Refills: 3 | Status: SHIPPED | OUTPATIENT
Start: 2024-06-26

## 2024-06-26 RX ORDER — LISINOPRIL 10 MG/1
10 TABLET ORAL NIGHTLY
Qty: 90 TABLET | Refills: 3 | Status: SHIPPED | OUTPATIENT
Start: 2024-06-26

## 2024-06-26 RX ORDER — FUROSEMIDE 20 MG/1
20 TABLET ORAL EVERY OTHER DAY
Qty: 45 TABLET | Refills: 3 | Status: SHIPPED | OUTPATIENT
Start: 2024-06-26 | End: 2025-06-26

## 2024-06-26 NOTE — PATIENT INSTRUCTIONS
of pain reliever, such as acetaminophen (Tylenol).   When should you call for help?   Call 911 if you have symptoms of a heart attack. These may include:    Chest pain or pressure, or a strange feeling in the chest.     Sweating.     Shortness of breath.     Pain, pressure, or a strange feeling in the back, neck, jaw, or upper belly or in one or both shoulders or arms.     Lightheadedness or sudden weakness.     A fast or irregular heartbeat.   After you call 911, the  may tell you to chew 1 adult-strength or 2 to 4 low-dose aspirin. Wait for an ambulance. Do not try to drive yourself.  Watch closely for changes in your health, and be sure to contact your doctor if you have any problems.  Where can you learn more?  Go to https://www.Planet[employer].net/patientEd and enter F075 to learn more about \"A Healthy Heart: Care Instructions.\"  Current as of: June 24, 2023  Content Version: 14.1  © 0760-2982 [employer].   Care instructions adapted under license by [employer]. If you have questions about a medical condition or this instruction, always ask your healthcare professional. [employer] disclaims any warranty or liability for your use of this information.      Personalized Preventive Plan for Maura Batista - 6/26/2024  Medicare offers a range of preventive health benefits. Some of the tests and screenings are paid in full while other may be subject to a deductible, co-insurance, and/or copay.    Some of these benefits include a comprehensive review of your medical history including lifestyle, illnesses that may run in your family, and various assessments and screenings as appropriate.    After reviewing your medical record and screening and assessments performed today your provider may have ordered immunizations, labs, imaging, and/or referrals for you.  A list of these orders (if applicable) as well as your Preventive Care list are included within your After Visit Summary for your

## 2024-06-26 NOTE — PROGRESS NOTES
Maura Batista  Identified pt with two pt identifiers(name and ).     Chief Complaint   Patient presents with    Medicare AWV    Diabetes    Hypertension    Cholesterol Problem       Reviewed record In preparation for visit and have obtained necessary documentation.     1. Have you been to the ER, urgent care clinic or hospitalized since your last visit? No     2. Have you seen or consulted any other health care providers outside of the [facility] since your last visit? Include any pap smears or colon screening. No    Vitals reviewed with provider.    Health Maintenance reviewed:     Health Maintenance Due   Topic    DEXA (modify frequency per FRAX score)     Annual Wellness Visit (Medicare)           Wt Readings from Last 3 Encounters:   24 108.9 kg (240 lb)   24 108.9 kg (240 lb)   24 110 kg (242 lb 6.4 oz)        Temp Readings from Last 3 Encounters:   24 97.5 °F (36.4 °C) (Oral)   24 97.6 °F (36.4 °C) (Temporal)   24 97.5 °F (36.4 °C)        BP Readings from Last 3 Encounters:   24 135/81   24 (!) 128/98   24 119/78        Pulse Readings from Last 3 Encounters:   24 73   24 72   24 76      [unfilled]       Learning Assessment:   :         2024    10:00 AM   [facility] AMB LEARNING ASSESSMENT   Primary Learner Patient   level of education 4 YEARS OF COLLEGE   Barriers Factors NONE   co-learner caregiver No   Primary Language ENGLISH   Learning Preference DEMONSTRATION   Answered By patient   Relationship to Learner SELF         Fall Risk Assessment:   :         2024    11:55 AM 3/19/2024     3:00 PM   Amb Fall Risk Assessment and TUG Test   Do you feel unsteady or are you worried about falling?  yes no   2 or more falls in past year? no no   Fall with injury in past year? no no          Abuse Screening:   :         2024     9:00 AM   AMB Abuse Screening   Do you ever feel afraid of your partner? N   Are you in a 
bedtime Yes Kate Meza APRN - CNP   metFORMIN (GLUCOPHAGE-XR) 500 MG extended release tablet Take 1 tablet by mouth with breakfast and with evening meal Yes Kate Meza APRN - CNP   rosuvastatin (CRESTOR) 40 MG tablet Take 1 tablet by mouth daily Yes Kate Meza APRN - CNP   spironolactone (ALDACTONE) 25 MG tablet Take 1 tablet by mouth daily Yes Kate Meza APRN - CNP   Probiotic Product (PROBIOTIC PO) daily Yes Provider, MD Rekha   metoprolol tartrate (LOPRESSOR) 25 MG tablet Take 1 tablet by mouth 2 times daily Yes Provider, MD Rekha   metroNIDAZOLE (METROCREAM) 0.75 % cream  Yes Provider, MD Rekha   aspirin 81 MG chewable tablet Take 1 tablet by mouth daily Yes Provider, MD Rekha   triamcinolone (KENALOG) 0.1 % cream Apply topically 2 times daily Yes Provider, MD Rekha   Cetirizine HCl (ZYRTEC ALLERGY) 10 MG CAPS Take by mouth daily Yes Automatic Reconciliation, Ar   Cholecalciferol 75 MCG (3000 UT) TABS Take by mouth Yes Automatic Reconciliation, Ar   cyanocobalamin 1000 MCG tablet Take 1 tablet by mouth daily Yes Automatic Reconciliation, Ar       CareTeam (Including outside providers/suppliers regularly involved in providing care):   Patient Care Team:  Kate Meza APRN - CNP as PCP - General (Nurse Practitioner)  Kate Meza APRN - CNP as PCP - Empaneled Provider  Jose A Alva MD (Cardiology)  Billy Mera MD (Colon and Rectal Surgery)  Patti Marx MD (Obstetrics & Gynecology)     Reviewed and updated this visit:  Tobacco  Allergies  Meds  Problems  Med Hx  Surg Hx  Soc Hx  Fam Hx

## 2024-07-31 LAB
LEFT VENTRICULAR EJECTION FRACTION HIGH VALUE: 55 %
LEFT VENTRICULAR EJECTION FRACTION MODE: NORMAL
LV EF: 55 %

## 2024-08-27 ENCOUNTER — TELEPHONE (OUTPATIENT)
Facility: CLINIC | Age: 84
End: 2024-08-27

## 2024-08-28 ENCOUNTER — OFFICE VISIT (OUTPATIENT)
Facility: CLINIC | Age: 84
End: 2024-08-28
Payer: MEDICARE

## 2024-08-28 VITALS
OXYGEN SATURATION: 95 % | HEART RATE: 68 BPM | RESPIRATION RATE: 12 BRPM | DIASTOLIC BLOOD PRESSURE: 76 MMHG | TEMPERATURE: 97.9 F | WEIGHT: 241.5 LBS | HEIGHT: 67 IN | BODY MASS INDEX: 37.9 KG/M2 | SYSTOLIC BLOOD PRESSURE: 127 MMHG

## 2024-08-28 DIAGNOSIS — I50.32 CHRONIC HEART FAILURE WITH PRESERVED EJECTION FRACTION (HCC): ICD-10-CM

## 2024-08-28 DIAGNOSIS — J01.90 ACUTE NON-RECURRENT SINUSITIS, UNSPECIFIED LOCATION: Primary | ICD-10-CM

## 2024-08-28 PROCEDURE — 1090F PRES/ABSN URINE INCON ASSESS: CPT | Performed by: INTERNAL MEDICINE

## 2024-08-28 PROCEDURE — 3078F DIAST BP <80 MM HG: CPT | Performed by: INTERNAL MEDICINE

## 2024-08-28 PROCEDURE — 1036F TOBACCO NON-USER: CPT | Performed by: INTERNAL MEDICINE

## 2024-08-28 PROCEDURE — 3074F SYST BP LT 130 MM HG: CPT | Performed by: INTERNAL MEDICINE

## 2024-08-28 PROCEDURE — G8427 DOCREV CUR MEDS BY ELIG CLIN: HCPCS | Performed by: INTERNAL MEDICINE

## 2024-08-28 PROCEDURE — G8417 CALC BMI ABV UP PARAM F/U: HCPCS | Performed by: INTERNAL MEDICINE

## 2024-08-28 PROCEDURE — 1123F ACP DISCUSS/DSCN MKR DOCD: CPT | Performed by: INTERNAL MEDICINE

## 2024-08-28 PROCEDURE — 99213 OFFICE O/P EST LOW 20 MIN: CPT | Performed by: INTERNAL MEDICINE

## 2024-08-28 PROCEDURE — G8400 PT W/DXA NO RESULTS DOC: HCPCS | Performed by: INTERNAL MEDICINE

## 2024-08-28 RX ORDER — CEFUROXIME AXETIL 250 MG/1
TABLET ORAL
COMMUNITY
Start: 2024-08-20 | End: 2024-08-29

## 2024-08-28 RX ORDER — DOXYCYCLINE HYCLATE 100 MG
100 TABLET ORAL 2 TIMES DAILY
Qty: 20 TABLET | Refills: 0 | Status: SHIPPED | OUTPATIENT
Start: 2024-08-28 | End: 2024-09-07

## 2024-08-28 RX ORDER — FLUCONAZOLE 150 MG/1
150 TABLET ORAL ONCE
Qty: 1 TABLET | Refills: 1 | Status: SHIPPED | OUTPATIENT
Start: 2024-08-28 | End: 2024-08-28

## 2024-08-28 RX ORDER — IPRATROPIUM BROMIDE 21 UG/1
SPRAY, METERED NASAL
COMMUNITY
Start: 2024-08-20

## 2024-08-28 NOTE — PROGRESS NOTES
Maura Batista  Identified pt with two pt identifiers(name and ).     Chief Complaint   Patient presents with    Sinusitis     Went to Pt !st given antibiotic, last day tomorrow. Still having drainage with yellow bloody secretions       Reviewed record In preparation for visit and have obtained necessary documentation.     1. Have you been to the ER, urgent care clinic or hospitalized since your last visit? Pt 1st     2. Have you seen or consulted any other health care providers outside of the [facility] since your last visit? Include any pap smears or colon screening. Cardiology     Vitals reviewed with provider.    Health Maintenance reviewed:     Health Maintenance Due   Topic    DEXA (modify frequency per FRAX score)     Flu vaccine (1)          Wt Readings from Last 3 Encounters:   24 109.5 kg (241 lb 8 oz)   24 108.9 kg (240 lb)   24 108.9 kg (240 lb)        Temp Readings from Last 3 Encounters:   24 97.9 °F (36.6 °C) (Oral)   24 97.5 °F (36.4 °C) (Oral)   24 97.6 °F (36.4 °C) (Temporal)        BP Readings from Last 3 Encounters:   24 127/76   24 135/81   24 (!) 128/98        Pulse Readings from Last 3 Encounters:   24 68   24 73   24 72      [unfilled]       Learning Assessment:   :         2024    10:00 AM   [facility] AMB LEARNING ASSESSMENT   Primary Learner Patient   level of education 4 YEARS OF COLLEGE   Barriers Factors NONE   co-learner caregiver No   Primary Language ENGLISH   Learning Preference DEMONSTRATION   Answered By patient   Relationship to Learner SELF         Fall Risk Assessment:   :         2024    11:55 AM 3/19/2024     3:00 PM   Amb Fall Risk Assessment and TUG Test   Do you feel unsteady or are you worried about falling?  yes no   2 or more falls in past year? no no   Fall with injury in past year? no no          Abuse Screening:   :         2024     9:00 AM   AMB Abuse Screening   Do you

## 2024-08-28 NOTE — PROGRESS NOTES
HPI  Ms. Maura Batista is a 84 y.o. year old female, she is seen today for sinus infection - was given ceftin and ipratropium nasal spray for sinus infection on 8/20/24.  At that time had had 1.5 weeks of symptoms.   Covid was negative    Symptoms improved but still has bloody, greenish nasal discharge from left side with postnasal drip and sore throat and left sided facial pressure.   Fever initially, none initially - coughing in morning. No wheezing. No sob.   Was also prescribed diflucan also- symptoms of yeast infection improved but now starting to come back  Chief Complaint   Patient presents with    Sinusitis     Went to Pt !st given antibiotic, last day tomorrow. Still having drainage with yellow bloody secretions        Prior to Admission medications    Medication Sig Start Date End Date Taking? Authorizing Provider   cefUROXime (CEFTIN) 250 MG tablet  8/20/24 8/29/24 Yes Provider, MD Rekha   ipratropium (ATROVENT) 0.03 % nasal spray  8/20/24  Yes Provider, MD Rekha   doxycycline hyclate (VIBRA-TABS) 100 MG tablet Take 1 tablet by mouth 2 times daily for 10 days 8/28/24 9/7/24 Yes Romy Morales MD   fluconazole (DIFLUCAN) 150 MG tablet Take 1 tablet by mouth once for 1 dose 8/28/24 8/28/24 Yes Romy Morales MD   estradiol (ESTRACE) 0.1 MG/GM vaginal cream Place 1 g vaginally three times a week 6/26/24 6/26/25 Yes Kate Meza APRN - CNP   furosemide (LASIX) 20 MG tablet Take 1 tablet by mouth every other day 6/26/24 6/26/25 Yes Kate Meza APRN - CNP   lisinopril (PRINIVIL;ZESTRIL) 10 MG tablet Take 1 tablet by mouth at bedtime 6/26/24  Yes Kate Meza APRN - CNP   metFORMIN (GLUCOPHAGE-XR) 500 MG extended release tablet Take 1 tablet by mouth with breakfast and with evening meal 6/26/24  Yes Kate Meza APRN - CNP   rosuvastatin (CRESTOR) 40 MG tablet Take 1 tablet by mouth daily 6/26/24  Yes Meza, Kate B, APRN - CNP   spironolactone (ALDACTONE) 25 MG tablet Take

## 2024-10-03 ENCOUNTER — OFFICE VISIT (OUTPATIENT)
Facility: CLINIC | Age: 84
End: 2024-10-03

## 2024-10-03 VITALS
HEART RATE: 60 BPM | OXYGEN SATURATION: 94 % | TEMPERATURE: 97.8 F | WEIGHT: 241.6 LBS | BODY MASS INDEX: 37.92 KG/M2 | HEIGHT: 67 IN | DIASTOLIC BLOOD PRESSURE: 74 MMHG | SYSTOLIC BLOOD PRESSURE: 126 MMHG

## 2024-10-03 DIAGNOSIS — J43.9 PULMONARY EMPHYSEMA, UNSPECIFIED EMPHYSEMA TYPE (HCC): ICD-10-CM

## 2024-10-03 DIAGNOSIS — I10 ESSENTIAL HYPERTENSION, BENIGN: ICD-10-CM

## 2024-10-03 DIAGNOSIS — Z29.9 PREVENTIVE MEASURE: ICD-10-CM

## 2024-10-03 DIAGNOSIS — Z23 NEEDS FLU SHOT: ICD-10-CM

## 2024-10-03 DIAGNOSIS — J31.0 NON-ALLERGIC RHINITIS: ICD-10-CM

## 2024-10-03 DIAGNOSIS — E11.9 TYPE 2 DIABETES MELLITUS WITHOUT COMPLICATION, WITHOUT LONG-TERM CURRENT USE OF INSULIN (HCC): Primary | ICD-10-CM

## 2024-10-03 RX ORDER — AZELASTINE 1 MG/ML
1 SPRAY, METERED NASAL 2 TIMES DAILY
Qty: 30 ML | Refills: 5 | Status: SHIPPED | OUTPATIENT
Start: 2024-10-03

## 2024-10-03 NOTE — PROGRESS NOTES
Maura Batista is a 84 y.o. female with DMII, HFpEF,  LBBB w/PVCs, , HTN, HLD, IBS, spinal stenosis, and FHx colon cancer, who presents for  Chief Complaint   Patient presents with    Follow-up     1A//   F/u and labs        HPI    No new c/o  Ongoing issues with nasal drainage > 2 years  Trial Astelin for suspected non allergic rhinitis    O2 is low today at 94%.  She is again noted to sigh several times during the visit, occ purse-lipped breathing with conversation.  She was dx'd with emphysema per 2022 XR (prior PCP), but has never seen pulm.  Never tried LABA/LAMA b/c of cost.  Not particularly interested in seeing another specialist (\"gotta die from something\"), but willing to go to [facility] for eval.     Flu vaccine today.    Vitals:    10/03/24 1137   BP: 126/74   Site: Right Upper Arm   Position: Sitting   Cuff Size: Large Adult   Pulse: 60   Temp: 97.8 °F (36.6 °C)   TempSrc: Oral   SpO2: 94%   Weight: 109.6 kg (241 lb 9.6 oz)   Height: 1.702 m (5' 7\")        Wt Readings from Last 3 Encounters:   10/03/24 109.6 kg (241 lb 9.6 oz)   08/28/24 109.5 kg (241 lb 8 oz)   06/26/24 108.9 kg (240 lb)      Body mass index is 37.84 kg/m².     Review of Systems   Constitutional: Negative for headache, fever, and unexpected weight change.  Eyes: Negative for visual changes.  Respiratory: She denies shortness of breath, wheezing.  Cardiovascular: Negative for chest pain, palpitations, shortness of breath, and leg swelling.  GI/: No changes in bowels or bladder.    Musculoskeletal: No joint pain or swelling.  Skin: No rashes, skin changes  Neurological: Negative for dizziness, weakness.   Hematological: Negative for adenopathy. Does not bruise/bleed easily.   Psychiatric: Negative for anxiety, dysphoria, confusion, sleep disturbance.        6/23/2024    11:55 AM 3/19/2024     3:00 PM   PHQ Scores   PHQ2 Score 0 0   PHQ9 Score 0 0     Interpretation of Total Score Depression Severity: 1-4 = Minimal depression, 5-9 = Mild

## 2024-10-05 LAB
ALBUMIN SERPL-MCNC: 3.8 G/DL (ref 3.5–5)
ALBUMIN/GLOB SERPL: 1.3 (ref 1.1–2.2)
ALP SERPL-CCNC: 40 U/L (ref 45–117)
ALT SERPL-CCNC: 22 U/L (ref 12–78)
ANION GAP SERPL CALC-SCNC: 5 MMOL/L (ref 2–12)
AST SERPL-CCNC: 12 U/L (ref 15–37)
BILIRUB SERPL-MCNC: <0.1 MG/DL (ref 0.2–1)
BUN SERPL-MCNC: 32 MG/DL (ref 6–20)
BUN/CREAT SERPL: 39 (ref 12–20)
CALCIUM SERPL-MCNC: 9.4 MG/DL (ref 8.5–10.1)
CHLORIDE SERPL-SCNC: 105 MMOL/L (ref 97–108)
CO2 SERPL-SCNC: 25 MMOL/L (ref 21–32)
CREAT SERPL-MCNC: 0.83 MG/DL (ref 0.55–1.02)
EST. AVERAGE GLUCOSE BLD GHB EST-MCNC: 131 MG/DL
GLOBULIN SER CALC-MCNC: 3 G/DL (ref 2–4)
GLUCOSE SERPL-MCNC: 117 MG/DL (ref 65–100)
HBA1C MFR BLD: 6.2 % (ref 4–5.6)
POTASSIUM SERPL-SCNC: 4.4 MMOL/L (ref 3.5–5.1)
PROT SERPL-MCNC: 6.8 G/DL (ref 6.4–8.2)
SODIUM SERPL-SCNC: 135 MMOL/L (ref 136–145)

## 2024-11-11 ENCOUNTER — TELEPHONE (OUTPATIENT)
Facility: CLINIC | Age: 84
End: 2024-11-11

## 2024-11-11 NOTE — TELEPHONE ENCOUNTER
Patient daughter called wants an appt for her mother she has a abscess under her toe.  Please call nadira [phone]

## 2025-02-13 ENCOUNTER — TELEPHONE (OUTPATIENT)
Facility: CLINIC | Age: 85
End: 2025-02-13

## 2025-02-13 RX ORDER — OSELTAMIVIR PHOSPHATE 75 MG/1
75 CAPSULE ORAL DAILY
Qty: 7 CAPSULE | Refills: 0 | Status: SHIPPED | OUTPATIENT
Start: 2025-02-13 | End: 2025-02-20

## 2025-02-13 NOTE — TELEPHONE ENCOUNTER
Patient daughter tested positive for the flu yesterday and would like Haritha flu sent to [facility]

## 2025-03-13 SDOH — ECONOMIC STABILITY: TRANSPORTATION INSECURITY
IN THE PAST 12 MONTHS, HAS THE LACK OF TRANSPORTATION KEPT YOU FROM MEDICAL APPOINTMENTS OR FROM GETTING MEDICATIONS?: NO

## 2025-03-13 SDOH — ECONOMIC STABILITY: FOOD INSECURITY: WITHIN THE PAST 12 MONTHS, YOU WORRIED THAT YOUR FOOD WOULD RUN OUT BEFORE YOU GOT MONEY TO BUY MORE.: NEVER TRUE

## 2025-03-13 SDOH — ECONOMIC STABILITY: INCOME INSECURITY: IN THE LAST 12 MONTHS, WAS THERE A TIME WHEN YOU WERE NOT ABLE TO PAY THE MORTGAGE OR RENT ON TIME?: NO

## 2025-03-13 SDOH — ECONOMIC STABILITY: FOOD INSECURITY: WITHIN THE PAST 12 MONTHS, THE FOOD YOU BOUGHT JUST DIDN'T LAST AND YOU DIDN'T HAVE MONEY TO GET MORE.: NEVER TRUE

## 2025-03-14 ENCOUNTER — OFFICE VISIT (OUTPATIENT)
Facility: CLINIC | Age: 85
End: 2025-03-14

## 2025-03-14 VITALS
SYSTOLIC BLOOD PRESSURE: 130 MMHG | HEART RATE: 61 BPM | BODY MASS INDEX: 38.96 KG/M2 | TEMPERATURE: 97.7 F | WEIGHT: 248.2 LBS | RESPIRATION RATE: 16 BRPM | OXYGEN SATURATION: 94 % | DIASTOLIC BLOOD PRESSURE: 66 MMHG | HEIGHT: 67 IN

## 2025-03-14 DIAGNOSIS — I49.3 ASYMPTOMATIC PVCS: ICD-10-CM

## 2025-03-14 DIAGNOSIS — E53.8 B12 DEFICIENCY: ICD-10-CM

## 2025-03-14 DIAGNOSIS — Z98.890 HISTORY OF CARDIAC CATH: ICD-10-CM

## 2025-03-14 DIAGNOSIS — J43.8 OTHER EMPHYSEMA (HCC): ICD-10-CM

## 2025-03-14 DIAGNOSIS — I44.7 LBBB (LEFT BUNDLE BRANCH BLOCK): ICD-10-CM

## 2025-03-14 DIAGNOSIS — E55.9 VITAMIN D DEFICIENCY: ICD-10-CM

## 2025-03-14 DIAGNOSIS — E78.2 MIXED HYPERLIPIDEMIA: ICD-10-CM

## 2025-03-14 DIAGNOSIS — Z29.9 PREVENTIVE MEASURE: ICD-10-CM

## 2025-03-14 DIAGNOSIS — E11.9 TYPE 2 DIABETES MELLITUS WITHOUT COMPLICATION, WITHOUT LONG-TERM CURRENT USE OF INSULIN (HCC): Primary | ICD-10-CM

## 2025-03-14 DIAGNOSIS — I50.32 CHRONIC HEART FAILURE WITH PRESERVED EJECTION FRACTION (HCC): ICD-10-CM

## 2025-03-14 DIAGNOSIS — I10 ESSENTIAL HYPERTENSION, BENIGN: ICD-10-CM

## 2025-03-14 DIAGNOSIS — I35.0 NONRHEUMATIC AORTIC VALVE STENOSIS: ICD-10-CM

## 2025-03-14 DIAGNOSIS — N95.9 POST MENOPAUSAL PROBLEMS: ICD-10-CM

## 2025-03-14 RX ORDER — HYDROCHLOROTHIAZIDE 12.5 MG/1
1 TABLET ORAL EVERY MORNING
COMMUNITY
End: 2025-03-14

## 2025-03-14 ASSESSMENT — PATIENT HEALTH QUESTIONNAIRE - PHQ9
SUM OF ALL RESPONSES TO PHQ QUESTIONS 1-9: 0
1. LITTLE INTEREST OR PLEASURE IN DOING THINGS: NOT AT ALL
2. FEELING DOWN, DEPRESSED OR HOPELESS: NOT AT ALL

## 2025-03-14 NOTE — PROGRESS NOTES
Maura Batista is a 84 y.o. female with DMII, HFpEF,  LBBB w/PVCs, HTN, HLD, IBS, spinal stenosis, and FHx colon cancer, who presents for  Chief Complaint   Patient presents with    Diabetes     Room 1A //     Hypertension    Cholesterol Problem       HPI    Mrs Batista presents for routine follow up and lab work.    O2 is low again today at 94%.  Emphysema suspected per XR in 2022 (prior PCP)  She recently saw pulm in challenge of that dx, but has not had PFTs  Never tried LABA/LAMA b/c of cost.    She is concerned about fatigue - wonders about BP vs LBBB vs HF  As above, there is also some question about COPD  Echo is scheduled for July  Has not scheduled PFTs  Has follow up in August with Dr Alva (cardiology)    HM  Dexa bone density ordered 3/14/2025       Vitals:    03/14/25 1122   BP: 130/66   BP Site: Left Upper Arm   Patient Position: Sitting   Pulse: 61   Resp: 16   Temp: 97.7 °F (36.5 °C)   TempSrc: Oral   SpO2: 94%   Weight: 112.6 kg (248 lb 3.2 oz)   Height: 1.702 m (5' 7\")          Wt Readings from Last 3 Encounters:   03/14/25 112.6 kg (248 lb 3.2 oz)   10/03/24 109.6 kg (241 lb 9.6 oz)   08/28/24 109.5 kg (241 lb 8 oz)      Body mass index is 38.87 kg/m².     Review of Systems   Constitutional: Negative for headache, fever, and unexpected weight change. +fatigue  Eyes: Negative for visual changes.  Respiratory: She denies wheezing. +SOB/RANDALL (baseline)  Cardiovascular: Negative for chest pain, palpitations. +leg swelling, L>R (baseline)  GI/: No changes in bowels or bladder.    Skin: No rashes, skin changes  Neurological: Negative for dizziness, weakness.   Hematological: Negative for adenopathy. Does not bruise/bleed easily.   Psychiatric: Negative for anxiety, dysphoria, confusion, sleep disturbance.        3/14/2025    11:28 AM 6/23/2024    11:55 AM 3/19/2024     3:00 PM   PHQ Scores   PHQ2 Score 0 0 0   PHQ9 Score 0 0 0     Interpretation of Total Score Depression Severity: 1-4 = Minimal

## 2025-03-14 NOTE — PROGRESS NOTES
Maura Batista  Identified pt with two pt identifiers(name and ).     Chief Complaint   Patient presents with    Diabetes     Room 1A //     Hypertension    Cholesterol Problem       Reviewed record In preparation for visit and have obtained necessary documentation.     1. Have you been to the ER, urgent care clinic or hospitalized since your last visit? No     2. Have you seen or consulted any other health care providers outside of the [facility] since your last visit? Include any pap smears or colon screening. No    Patient has an advance directive.     Vitals reviewed with provider.    Health Maintenance reviewed:     Health Maintenance Due   Topic    Diabetic Alb to Cr ratio (uACR) test     DEXA (modify frequency per FRAX score)     COVID-19 Vaccine (3 - 2024-25 season)    Lipids           Wt Readings from Last 3 Encounters:   25 112.6 kg (248 lb 3.2 oz)   10/03/24 109.6 kg (241 lb 9.6 oz)   24 109.5 kg (241 lb 8 oz)        Temp Readings from Last 3 Encounters:   10/03/24 97.8 °F (36.6 °C) (Oral)   24 97.9 °F (36.6 °C) (Oral)        BP Readings from Last 3 Encounters:   10/03/24 126/74   24 127/76   24 135/81        Pulse Readings from Last 3 Encounters:   10/03/24 60   24 68   24 73             No data to display                  Learning Assessment:   :         2024    10:00 AM   [facility] AMB LEARNING ASSESSMENT   Primary Learner Patient   level of education 4 YEARS OF COLLEGE   Barriers Factors NONE   co-learner caregiver No   Primary Language ENGLISH   Learning Preference DEMONSTRATION   Answered By patient   Relationship to Learner SELF         Fall Risk Assessment:         2024    11:55 AM 3/19/2024     3:00 PM   Amb Fall Risk Assessment and TUG Test   Do you feel unsteady or are you worried about falling?  yes no   2 or more falls in past year? no no   Fall with injury in past year? no no         Abuse Screenin/26/2024     9:00

## 2025-03-15 LAB
25(OH)D3 SERPL-MCNC: 61 NG/ML (ref 30–100)
ALBUMIN SERPL-MCNC: 3.7 G/DL (ref 3.5–5)
ALBUMIN/GLOB SERPL: 1.2 (ref 1.1–2.2)
ALP SERPL-CCNC: 42 U/L (ref 45–117)
ALT SERPL-CCNC: 18 U/L (ref 12–78)
ANION GAP SERPL CALC-SCNC: 6 MMOL/L (ref 2–12)
AST SERPL-CCNC: 14 U/L (ref 15–37)
BASOPHILS # BLD: 0.05 K/UL (ref 0–0.1)
BASOPHILS NFR BLD: 0.7 % (ref 0–1)
BILIRUB SERPL-MCNC: 0.4 MG/DL (ref 0.2–1)
BUN SERPL-MCNC: 28 MG/DL (ref 6–20)
BUN/CREAT SERPL: 37 (ref 12–20)
CALCIUM SERPL-MCNC: 9.6 MG/DL (ref 8.5–10.1)
CHLORIDE SERPL-SCNC: 105 MMOL/L (ref 97–108)
CHOLEST SERPL-MCNC: 113 MG/DL
CO2 SERPL-SCNC: 25 MMOL/L (ref 21–32)
CREAT SERPL-MCNC: 0.76 MG/DL (ref 0.55–1.02)
CREAT UR-MCNC: 81.6 MG/DL
DIFFERENTIAL METHOD BLD: NORMAL
EOSINOPHIL # BLD: 0.16 K/UL (ref 0–0.4)
EOSINOPHIL NFR BLD: 2.4 % (ref 0–7)
ERYTHROCYTE [DISTWIDTH] IN BLOOD BY AUTOMATED COUNT: 13.2 % (ref 11.5–14.5)
EST. AVERAGE GLUCOSE BLD GHB EST-MCNC: 131 MG/DL
GLOBULIN SER CALC-MCNC: 3 G/DL (ref 2–4)
GLUCOSE SERPL-MCNC: 146 MG/DL (ref 65–100)
HBA1C MFR BLD: 6.2 % (ref 4–5.6)
HCT VFR BLD AUTO: 40.4 % (ref 35–47)
HDLC SERPL-MCNC: 63 MG/DL
HDLC SERPL: 1.8 (ref 0–5)
HGB BLD-MCNC: 13.1 G/DL (ref 11.5–16)
IMM GRANULOCYTES # BLD AUTO: 0.01 K/UL (ref 0–0.04)
IMM GRANULOCYTES NFR BLD AUTO: 0.1 % (ref 0–0.5)
LDLC SERPL CALC-MCNC: 27.6 MG/DL (ref 0–100)
LYMPHOCYTES # BLD: 1.36 K/UL (ref 0.8–3.5)
LYMPHOCYTES NFR BLD: 20.3 % (ref 12–49)
MCH RBC QN AUTO: 27.6 PG (ref 26–34)
MCHC RBC AUTO-ENTMCNC: 32.4 G/DL (ref 30–36.5)
MCV RBC AUTO: 85.2 FL (ref 80–99)
MICROALBUMIN UR-MCNC: 1.74 MG/DL
MICROALBUMIN/CREAT UR-RTO: 21 MG/G (ref 0–30)
MONOCYTES # BLD: 0.46 K/UL (ref 0–1)
MONOCYTES NFR BLD: 6.9 % (ref 5–13)
NEUTS SEG # BLD: 4.66 K/UL (ref 1.8–8)
NEUTS SEG NFR BLD: 69.6 % (ref 32–75)
NRBC # BLD: 0 K/UL (ref 0–0.01)
NRBC BLD-RTO: 0 PER 100 WBC
PLATELET # BLD AUTO: 266 K/UL (ref 150–400)
PMV BLD AUTO: 9.9 FL (ref 8.9–12.9)
POTASSIUM SERPL-SCNC: 4.2 MMOL/L (ref 3.5–5.1)
PROT SERPL-MCNC: 6.7 G/DL (ref 6.4–8.2)
RBC # BLD AUTO: 4.74 M/UL (ref 3.8–5.2)
SODIUM SERPL-SCNC: 136 MMOL/L (ref 136–145)
TRIGL SERPL-MCNC: 112 MG/DL
TSH SERPL DL<=0.05 MIU/L-ACNC: 0.65 UIU/ML (ref 0.36–3.74)
VIT B12 SERPL-MCNC: 1310 PG/ML (ref 193–986)
VLDLC SERPL CALC-MCNC: 22.4 MG/DL
WBC # BLD AUTO: 6.7 K/UL (ref 3.6–11)

## 2025-03-23 ENCOUNTER — RESULTS FOLLOW-UP (OUTPATIENT)
Facility: CLINIC | Age: 85
End: 2025-03-23

## 2025-05-22 ENCOUNTER — OFFICE VISIT (OUTPATIENT)
Facility: CLINIC | Age: 85
End: 2025-05-22

## 2025-05-22 VITALS
RESPIRATION RATE: 16 BRPM | HEART RATE: 75 BPM | BODY MASS INDEX: 38.8 KG/M2 | DIASTOLIC BLOOD PRESSURE: 76 MMHG | WEIGHT: 247.2 LBS | SYSTOLIC BLOOD PRESSURE: 132 MMHG | HEIGHT: 67 IN | TEMPERATURE: 98.5 F | OXYGEN SATURATION: 98 %

## 2025-05-22 DIAGNOSIS — R09.81 SINUS CONGESTION: Primary | ICD-10-CM

## 2025-05-22 DIAGNOSIS — J43.8 OTHER EMPHYSEMA (HCC): ICD-10-CM

## 2025-05-22 DIAGNOSIS — J01.01 ACUTE RECURRENT MAXILLARY SINUSITIS: ICD-10-CM

## 2025-05-22 LAB
EXP DATE SOLUTION: NORMAL
EXP DATE SWAB: NORMAL
EXPIRATION DATE: NORMAL
INFLUENZA A ANTIGEN, POC: NEGATIVE
INFLUENZA B ANTIGEN, POC: NEGATIVE
LOT NUMBER POC: NORMAL
LOT NUMBER SOLUTION: NORMAL
LOT NUMBER SWAB: NORMAL
SARS-COV-2 RNA, POC: NEGATIVE
VALID INTERNAL CONTROL, POC: NORMAL

## 2025-05-22 RX ORDER — AZITHROMYCIN 250 MG/1
TABLET, FILM COATED ORAL
Qty: 6 TABLET | Refills: 0 | Status: SHIPPED | OUTPATIENT
Start: 2025-05-22 | End: 2025-06-01

## 2025-05-22 RX ORDER — GUAIFENESIN 600 MG/1
600 TABLET, EXTENDED RELEASE ORAL 2 TIMES DAILY
Qty: 30 TABLET | Refills: 0 | Status: SHIPPED | OUTPATIENT
Start: 2025-05-22 | End: 2025-06-06

## 2025-05-22 RX ORDER — METHYLPREDNISOLONE 4 MG/1
TABLET ORAL
Qty: 21 TABLET | Refills: 0 | Status: SHIPPED | OUTPATIENT
Start: 2025-05-22 | End: 2025-05-28

## 2025-05-22 NOTE — PROGRESS NOTES
Maura Batista  Identified pt with two pt identifiers(name and ).  Chief Complaint   Patient presents with    Congestion    Facial Pain     Started at midnight        1. Have you been to the ER, urgent care clinic since your last visit?  Hospitalized since your last visit? NO    2. Have you seen or consulted any other health care providers outside of the [facility] since your last visit?  Include any pap smears or colon screening. NO      Provider notified of reason for visit, vitals and flowsheets obtained on patients.     Patient received paperwork for advance directive during previous visit but has not completed at this time     Reviewed record In preparation for visit, huddled with provider and have obtained necessary documentation      Health Maintenance Due   Topic    DEXA (modify frequency per FRAX score)     COVID-19 Vaccine (3 - 2024-25 season)       Wt Readings from Last 3 Encounters:   25 112.1 kg (247 lb 3.2 oz)   25 112.6 kg (248 lb 3.2 oz)   10/03/24 109.6 kg (241 lb 9.6 oz)     Temp Readings from Last 3 Encounters:   25 97.7 °F (36.5 °C) (Oral)   10/03/24 97.8 °F (36.6 °C) (Oral)   24 97.9 °F (36.6 °C) (Oral)     BP Readings from Last 3 Encounters:   25 130/66   10/03/24 126/74   24 127/76     Pulse Readings from Last 3 Encounters:   25 61   10/03/24 60   24 68          No data to display                  Learning Assessment:  :         2024    10:00 AM   [facility] AMB LEARNING ASSESSMENT   Primary Learner Patient   level of education 4 YEARS OF COLLEGE   Barriers Factors NONE   co-learner caregiver No   Primary Language ENGLISH   Learning Preference DEMONSTRATION   Answered By patient   Relationship to Learner SELF       Fall Risk Assessment:  :         2024    11:55 AM 3/19/2024     3:00 PM   Amb Fall Risk Assessment and TUG Test   Do you feel unsteady or are you worried about falling?  yes no   2 or more falls in past year? no no 
Score 0 0 0   PHQ9 Score 0 0 0     Interpretation of Total Score Depression Severity: 1-4 = Minimal depression, 5-9 = Mild depression, 10-14 = Moderate depression, 15-19 = Moderately severe depression, 20-27 = Severe depression          No data to display                 Physical Exam   Constitutional: Oriented to person, place, and time. Well-developed and well-nourished. No distress.  HENT:   Head: Normocephalic and atraumatic.   Eyes: Right eye exhibits no discharge. Left eye exhibits no discharge. Conjunctivae injected   +Tenderness to percussion over maxillary sinuses  Nasal mucosa boggy, erythematous.  +Nasal discharge.  +PND   TM's dull bilaterally  Neck: Normal range of motion. Neck supple.   Cardiovascular: Normal rate and regular rhythm. +murmur, II/VI systolic  Pulmonary/Chest: Effort normal and breath sounds normal. No wheezes.   Lymphadenopathy:     NT cervical adenopathy.    Neurological: Alert and oriented to person, place, and time.   Skin: Skin is warm and dry. No rash noted.   Psychiatric: Normal mood and affect. Behavior is normal. Judgment and thought content normal.      Past Medical History:   Diagnosis Date    Allergic rhinitis 1950s    Diabetes ([facility])     glucose runs 90-`140's at home    Eczema     Emphysema lung ([facility])     Essential hypertension, benign     Heart failure with preserved ejection fraction ([facility])     Hyperlipidemia     IBS (irritable bowel syndrome)     LBBB (left bundle branch block)     Prediabetes     PVC's (premature ventricular contractions)     Skin cancer     Spinal stenosis of lumbar region with radiculopathy     s/p laminectomy, still has some pain    Thyroid nodule     Type 2 diabetes mellitus without complication ([facility]) 2000    Urinary incontinence       Past Surgical History:   Procedure Laterality Date    APPENDECTOMY  1945    CARDIAC CATHETERIZATION  08/2022    COLONOSCOPY      COLONOSCOPY N/A 04/16/2024    COLONOSCOPY performed by Billy Mera MD at [facility]

## 2025-05-27 ENCOUNTER — TELEPHONE (OUTPATIENT)
Facility: CLINIC | Age: 85
End: 2025-05-27

## 2025-05-27 DIAGNOSIS — J01.01 ACUTE RECURRENT MAXILLARY SINUSITIS: Primary | ICD-10-CM

## 2025-05-27 RX ORDER — CEFDINIR 300 MG/1
300 CAPSULE ORAL 2 TIMES DAILY
Qty: 14 CAPSULE | Refills: 0 | Status: SHIPPED | OUTPATIENT
Start: 2025-05-27 | End: 2025-06-03

## 2025-05-27 NOTE — TELEPHONE ENCOUNTER
I called and spoke to Gayatri (on pt HIPAA) Pt is not doing better. TMAX 100 Friday night. Pt is yellow drainage with a mix of blood. The daughter would like a different medication for her mom. The Z pack is not working.

## 2025-05-27 NOTE — TELEPHONE ENCOUNTER
Patient daughter called and said her mother got a zpack last week but its not working for her mother. Please give Gayatri a call [phone] send to [facility]

## 2025-06-25 ENCOUNTER — HOSPITAL ENCOUNTER (OUTPATIENT)
Facility: HOSPITAL | Age: 85
Discharge: HOME | End: 2025-06-28
Payer: MEDICARE

## 2025-06-25 DIAGNOSIS — N95.9 POST MENOPAUSAL PROBLEMS: ICD-10-CM

## 2025-06-25 PROCEDURE — 77080 DXA BONE DENSITY AXIAL: CPT

## 2025-06-26 DIAGNOSIS — I50.32 CHRONIC HEART FAILURE WITH PRESERVED EJECTION FRACTION (HCC): ICD-10-CM

## 2025-06-26 NOTE — TELEPHONE ENCOUNTER
Future Appointments:  Future Appointments   Date Time Provider Department Center   10/20/2025  3:00 PM Kate Meza, APRN - CNP [facility] DEP        Last Appointment With Me:  5/22/2025     Requested Prescriptions     Pending Prescriptions Disp Refills    spironolactone (ALDACTONE) 25 MG tablet [Pharmacy Med Name: spironolactone 25 mg tablet] 90 tablet 3     Sig: TAKE ONE TABLET DAILY

## 2025-07-01 RX ORDER — SPIRONOLACTONE 25 MG/1
25 TABLET ORAL DAILY
Qty: 90 TABLET | Refills: 3 | Status: SHIPPED | OUTPATIENT
Start: 2025-07-01

## 2025-08-13 DIAGNOSIS — I10 ESSENTIAL HYPERTENSION, BENIGN: ICD-10-CM

## 2025-08-13 DIAGNOSIS — E11.9 TYPE 2 DIABETES MELLITUS WITHOUT COMPLICATION, WITHOUT LONG-TERM CURRENT USE OF INSULIN (HCC): ICD-10-CM

## 2025-08-13 RX ORDER — LISINOPRIL 10 MG/1
TABLET ORAL
Qty: 90 TABLET | Refills: 0 | Status: SHIPPED | OUTPATIENT
Start: 2025-08-13

## 2025-08-13 RX ORDER — METFORMIN HYDROCHLORIDE 500 MG/1
TABLET, EXTENDED RELEASE ORAL
Qty: 180 TABLET | Refills: 0 | Status: SHIPPED | OUTPATIENT
Start: 2025-08-13

## 2025-09-02 DIAGNOSIS — E78.2 MIXED HYPERLIPIDEMIA: ICD-10-CM

## 2025-09-03 RX ORDER — ROSUVASTATIN CALCIUM 40 MG/1
40 TABLET, COATED ORAL DAILY
Qty: 90 TABLET | Refills: 3 | Status: SHIPPED | OUTPATIENT
Start: 2025-09-03

## (undated) DEVICE — IV START KIT: Brand: MEDLINE

## (undated) DEVICE — SET GRAV CK VLV NEEDLESS ST 3 GANGED 4WAY STPCOCK HI FLO 10

## (undated) DEVICE — ENDOSCOPIC KIT COMPLIANCE ENDOKIT

## (undated) DEVICE — CUFF BLD PRSS AD CLTH SGL TB W/ BAYNT CONN ROUNDED CORNER